# Patient Record
Sex: MALE | URBAN - METROPOLITAN AREA
[De-identification: names, ages, dates, MRNs, and addresses within clinical notes are randomized per-mention and may not be internally consistent; named-entity substitution may affect disease eponyms.]

---

## 2021-06-23 ENCOUNTER — HOSPITAL ENCOUNTER (EMERGENCY)
Facility: CLINIC | Age: 81
Discharge: HOME | End: 2021-06-23

## 2021-06-23 VITALS
BODY MASS INDEX: 29.73 KG/M2 | RESPIRATION RATE: 16 BRPM | HEART RATE: 68 BPM | OXYGEN SATURATION: 95 % | SYSTOLIC BLOOD PRESSURE: 103 MMHG | TEMPERATURE: 98.1 F | HEIGHT: 66 IN | WEIGHT: 185 LBS | DIASTOLIC BLOOD PRESSURE: 68 MMHG

## 2021-06-23 ASSESSMENT — MIFFLIN-ST. JEOR: SCORE: 1496.9

## 2021-06-23 NOTE — ED TRIAGE NOTES
Triage Assessment & Note:    /68   Pulse 68   Temp 98.1  F (36.7  C) (Oral)   Resp 16   SpO2 95%     Patient presents with: PT reports abdominal pain with N/V/D.  PT has a urostomy. PT has an extensive abdominal surgery hx.     Home Treatments/Remedies: None    Febrile / Afebrile? Afebrile     Duration of C/o: 30hrs     Jeanmarie Locke RN  June 23, 2021

## 2021-06-24 NOTE — ED NOTES
"Have not seen pt yet at this point, he was erroneously \"roomed\" electronically earlier when I signed up for him and placed lab orders, but a different patient was actually brought back. Patient may have left [facility] many hours ago per triage RN, they just kept him in the computer in case he returned. Asking RN to try to call/reach out to patient to see if he could return for emergent evaluation/management as needed, and how welcome here.     Kecia García MD  06/23/21 3327    "

## 2021-06-24 NOTE — ED NOTES
No answer on patient's listed phone number, just goes to the answering machine at a [facility] clinic.  Therein, spoke with patient's daughter Jael who is listed as his POC.  Did not discuss any particular protected health information, but she offered the information that he had checked in earlier this afternoon, with feeling unwell and with the unfortunate ongoing wait times of the ED, elected to go back to her house to sleep.  His travel plans were to be returning home (back to [place]) tomorrow.  She reports he has multiple medical issues.  Again, not addressing any of the patient's personal health information, we acknowledge that he may have multiple issues, and for any patient who presents to the the ED, especially if they have medical issues, we do want to make sure that they are evaluated and managed appropriately.  Apologize for the previous wait,  though offered that we have currently cought up, could see him immediately, are very happy to see him, and encouraged him to return (especially to ensure no emergencies, and even recommended she wake him to make sure he doesn't want to come back). She will also will encourage him to be evaluated, and acknowledges that we are available to them at any time.  She reportedly lives close by, and they do not need our ED phone number. Appreciate her time.      Kecia García MD  06/23/21 0325